# Patient Record
Sex: FEMALE | Race: WHITE | NOT HISPANIC OR LATINO | Employment: UNEMPLOYED | URBAN - METROPOLITAN AREA
[De-identification: names, ages, dates, MRNs, and addresses within clinical notes are randomized per-mention and may not be internally consistent; named-entity substitution may affect disease eponyms.]

---

## 2023-09-27 ENCOUNTER — NON-PROVIDER VISIT (OUTPATIENT)
Dept: URGENT CARE | Facility: CLINIC | Age: 28
End: 2023-09-27
Payer: COMMERCIAL

## 2023-09-27 DIAGNOSIS — Z02.1 PRE-EMPLOYMENT DRUG SCREENING: ICD-10-CM

## 2023-09-27 LAB
AMP AMPHETAMINE: NORMAL
COC COCAINE: NORMAL
INT CON NEG: NORMAL
INT CON POS: NORMAL
MET METHAMPHETAMINES: NORMAL
OPI OPIATES: NORMAL
PCP PHENCYCLIDINE: NORMAL
POC DRUG COMMENT 753798-OCCUPATIONAL HEALTH: NEGATIVE
THC: NORMAL

## 2023-09-27 PROCEDURE — 80305 DRUG TEST PRSMV DIR OPT OBS: CPT | Performed by: PHYSICIAN ASSISTANT

## 2023-12-24 ENCOUNTER — OFFICE VISIT (OUTPATIENT)
Dept: URGENT CARE | Facility: CLINIC | Age: 28
End: 2023-12-24
Payer: COMMERCIAL

## 2023-12-24 VITALS
HEIGHT: 64 IN | OXYGEN SATURATION: 99 % | DIASTOLIC BLOOD PRESSURE: 48 MMHG | BODY MASS INDEX: 17.58 KG/M2 | RESPIRATION RATE: 16 BRPM | WEIGHT: 103 LBS | SYSTOLIC BLOOD PRESSURE: 90 MMHG | HEART RATE: 102 BPM | TEMPERATURE: 96.9 F

## 2023-12-24 DIAGNOSIS — R07.81 RIB PAIN ON RIGHT SIDE: ICD-10-CM

## 2023-12-24 PROCEDURE — 99213 OFFICE O/P EST LOW 20 MIN: CPT

## 2023-12-24 PROCEDURE — 3074F SYST BP LT 130 MM HG: CPT

## 2023-12-24 PROCEDURE — 3078F DIAST BP <80 MM HG: CPT

## 2023-12-24 NOTE — PROGRESS NOTES
"Subjective     Aster Copeland is a 28 y.o. female who presents with Rib Injury (Can't take a deep breath. X1 day. Right side. )            HPI patient works for [employer], she states they have been working a lot  This morning she woke up with right-sided rib pain  She does usually sleep on her right side  She states that hurts when she takes a deep breath or when she is twisting and moving  She denies any injury  She denies any cough or fevers      ROS Same as above      No Known Allergies    Current Outpatient Medications   Medication Sig    diclofenac sodium (VOLTAREN) 1 % Gel Apply 1 g topically 3 times a day as needed (pain).    paroxetine (PAXIL) 10 MG Tab Take 10 mg by mouth every day. (Patient not taking: Reported on 12/24/2023)    alprazolam (XANAX) 1 MG Tab Take 1 mg by mouth 3 times a day as needed for Anxiety. (Patient not taking: Reported on 12/24/2023)    propranolol CR (INDERAL LA) 80 MG CAPSULE SR 24 HR Take 80 mg by mouth every day. (Patient not taking: Reported on 12/24/2023)    zolpidem (AMBIEN) 10 MG Tab Take 10 mg by mouth at bedtime as needed for Sleep. (Patient not taking: Reported on 12/24/2023)        Past Medical History:   Diagnosis Date    Anxiety     Bipolar affective disorder (HCC)     Depression     Panic attack         Social History     Socioeconomic History    Marital status: Single   Tobacco Use    Smoking status: Never   Substance and Sexual Activity    Alcohol use: No    Drug use: Yes     Types: Inhaled     Comment: THC          Objective     BP 90/48 (BP Location: Left arm, Patient Position: Sitting, BP Cuff Size: Adult)   Pulse (!) 102   Temp 36.1 °C (96.9 °F) (Temporal)   Resp 16   Ht 1.626 m (5' 4\")   Wt 46.7 kg (103 lb)   SpO2 99%   BMI 17.68 kg/m²      Physical Exam  Vitals reviewed.   Constitutional:       Appearance: Normal appearance. She is not ill-appearing.   HENT:      Head: Normocephalic and atraumatic.      Nose: Nose normal. No congestion.   Eyes:      " Conjunctiva/sclera: Conjunctivae normal.      Pupils: Pupils are equal, round, and reactive to light.   Cardiovascular:      Rate and Rhythm: Normal rate and regular rhythm.      Heart sounds: Normal heart sounds.   Pulmonary:      Effort: Pulmonary effort is normal.      Breath sounds: Normal breath sounds. No stridor. No wheezing, rhonchi or rales.   Chest:      Chest wall: No swelling, tenderness or crepitus.      Comments: No pain with palpation of right ribs, no edema, no ecchymosis  Musculoskeletal:         General: Normal range of motion.      Cervical back: Normal range of motion and neck supple.   Skin:     General: Skin is warm and dry.   Neurological:      Mental Status: She is alert and oriented to person, place, and time.           Assessment & Plan     Patient comes in today due to right-sided rib pain.  The pain is when she takes a deep breath, or moves and twists.  She states she just woke up with it, she does sleep on her right side.  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    On exam there is no ecchymosis or edema to the right lateral rib cage.  There is no bony pain elicited with palpation.  Mild pain elicited with twisting motion, there is no decreased ROM.  No pain with palpation of thoracic spine.  Lung sounds are clear, no wheezing, no rhonchi, breath sounds are normal.  Heart sounds are normal, regular rhythm.  Discussed likely musculoskeletal etiology of current pain.  Discussed low likelihood of need for x-ray due to no actual injury, patient agrees and defers rib x-ray at this time.  We did discuss use of warm and cold compresses, Voltaren cream for symptom management.   Differential diagnosis, natural history, supportive care, and indications for immediate follow-up were discussed. Patient verbalized understanding and agreement with plan of care.  Note for work provided        1. Rib pain on right side  diclofenac sodium (VOLTAREN) 1 %  Gel

## 2023-12-24 NOTE — LETTER
December 24, 2023         Patient: Aster Copeland   YOB: 1995   Date of Visit: 12/24/2023           To Whom it May Concern:    Aster Copeland was seen in my clinic on 12/24/2023. Please excuse any absences this week due to an acute illness.     If you have any questions or concerns, please don't hesitate to call.        Sincerely,           MARTITA [email or web address].  Electronically Signed

## 2024-03-28 ENCOUNTER — OFFICE VISIT (OUTPATIENT)
Dept: URGENT CARE | Facility: PHYSICIAN GROUP | Age: 29
End: 2024-03-28
Payer: COMMERCIAL

## 2024-03-28 VITALS
BODY MASS INDEX: 19.46 KG/M2 | TEMPERATURE: 98.3 F | RESPIRATION RATE: 18 BRPM | WEIGHT: 114 LBS | OXYGEN SATURATION: 99 % | HEART RATE: 72 BPM | SYSTOLIC BLOOD PRESSURE: 108 MMHG | DIASTOLIC BLOOD PRESSURE: 60 MMHG | HEIGHT: 64 IN

## 2024-03-28 DIAGNOSIS — J06.9 VIRAL UPPER RESPIRATORY ILLNESS: ICD-10-CM

## 2024-03-28 PROCEDURE — 3074F SYST BP LT 130 MM HG: CPT | Performed by: PHYSICIAN ASSISTANT

## 2024-03-28 PROCEDURE — 99213 OFFICE O/P EST LOW 20 MIN: CPT | Performed by: PHYSICIAN ASSISTANT

## 2024-03-28 PROCEDURE — 3078F DIAST BP <80 MM HG: CPT | Performed by: PHYSICIAN ASSISTANT

## 2024-03-28 RX ORDER — DULOXETIN HYDROCHLORIDE 20 MG/1
20 CAPSULE, DELAYED RELEASE ORAL
COMMUNITY
Start: 2024-02-20

## 2024-03-28 RX ORDER — NAPROXEN 500 MG/1
TABLET ORAL
COMMUNITY
Start: 2024-02-20

## 2024-03-28 RX ORDER — POLYETHYLENE GLYCOL 3350 17 G
POWDER IN PACKET (EA) ORAL
COMMUNITY
Start: 2024-02-20

## 2024-03-28 ASSESSMENT — ENCOUNTER SYMPTOMS
SHORTNESS OF BREATH: 0
EYE DISCHARGE: 0
SORE THROAT: 1
VOMITING: 0
CONSTIPATION: 0
ABDOMINAL PAIN: 0
WHEEZING: 0
EYE REDNESS: 0
CHILLS: 0
DIARRHEA: 0
FEVER: 0
SINUS PAIN: 0
NAUSEA: 0
DIAPHORESIS: 0
HEADACHES: 0
EYE PAIN: 0
DIZZINESS: 0
COUGH: 1

## 2024-03-28 NOTE — PROGRESS NOTES
"  Subjective:     Aster Copeland  is a 28 y.o. female who presents for Letter for School/Work (Is here just for a note 3/27/24 and 3/28/24 )       Presents today with cough, sinus congestion and sore throat has been ongoing over the last 2 days.  Due to the symptoms she did need to miss work on 3/27 and 3/28 and is requesting a work note at this time.  Denies fever/chills/sweats, chest pain, shortness of breath, nausea/vomiting, abdominal pain, diarrhea.  Been using over-the-counter medications for symptoms       Review of Systems   Constitutional:  Negative for chills, diaphoresis, fever and malaise/fatigue.   HENT:  Positive for congestion and sore throat. Negative for ear discharge and sinus pain.    Eyes:  Negative for pain, discharge and redness.   Respiratory:  Positive for cough. Negative for shortness of breath and wheezing.    Cardiovascular:  Negative for chest pain.   Gastrointestinal:  Negative for abdominal pain, constipation, diarrhea, nausea and vomiting.   Neurological:  Negative for dizziness and headaches.      No Known Allergies  Past Medical History:   Diagnosis Date    Anxiety     Bipolar affective disorder (HCC)     Depression     Panic attack         Objective:   /60 (BP Location: Left arm, Patient Position: Sitting, BP Cuff Size: Adult)   Pulse 72   Temp 36.8 °C (98.3 °F) (Temporal)   Resp 18   Ht 1.626 m (5' 4\")   Wt 51.7 kg (114 lb)   SpO2 99%   BMI 19.57 kg/m²   Physical Exam  Vitals and nursing note reviewed.   Constitutional:       General: She is not in acute distress.     Appearance: Normal appearance. She is not ill-appearing, toxic-appearing or diaphoretic.   HENT:      Head: Normocephalic.      Right Ear: Tympanic membrane, ear canal and external ear normal. There is no impacted cerumen.      Left Ear: Tympanic membrane, ear canal and external ear normal. There is no impacted cerumen.      Nose: Congestion present. No rhinorrhea.      Mouth/Throat:      Mouth: Mucous " membranes are moist.      Pharynx: No oropharyngeal exudate or posterior oropharyngeal erythema.   Eyes:      General:         Right eye: No discharge.         Left eye: No discharge.      Conjunctiva/sclera: Conjunctivae normal.   Cardiovascular:      Rate and Rhythm: Normal rate and regular rhythm.   Pulmonary:      Effort: Pulmonary effort is normal. No respiratory distress.      Breath sounds: Normal breath sounds. No stridor. No wheezing or rhonchi.   Musculoskeletal:      Cervical back: Neck supple.   Lymphadenopathy:      Cervical: No cervical adenopathy.   Neurological:      General: No focal deficit present.      Mental Status: She is alert and oriented to person, place, and time.   Psychiatric:         Mood and Affect: Mood normal.         Behavior: Behavior normal.         Thought Content: Thought content normal.         Judgment: Judgment normal.             Diagnostic testing: None    Assessment/Plan:     Encounter Diagnoses   Name Primary?    Viral upper respiratory illness           Plan for care for today's complaint includes providing a work note for the patient.  Continue to use over-the-counter medications for symptom support.  Symptoms do not appear to be viral in nature, no evidence of support and medication at this time.  Vital signs are stable patient is well-appearing overall.  Continue to monitor symptoms and return to urgent care or follow-up with primary care provider if symptoms remain ongoing.  Follow-up in the emergency department if symptoms become severe, ER precautions discussed in office today..    See AVS Instructions below for written guidance provided to patient on after-visit management and care in addition to our verbal discussion during the visit.    Please note that this dictation was created using voice recognition software. I have attempted to correct all errors, but there may be sound-alike, spelling, grammar and possibly content errors that I did not discover before  finalizing the note.    Abdelrahman Pelayo PA-C

## 2024-03-28 NOTE — LETTER
[facility] [place]  [address]  [place] [id number]     March 28, 2024    Patient: Aster Copeland   YOB: 1995   Date of Visit: 3/28/2024       To Whom It May Concern:    Aster Copeland was seen and treated in our department on 3/28/2024.  Please excuse from work on 3/27/2024, 3/28/2024.  Can return to work at this time as she is no longer contagious    Sincerely,     Abdelrahman CAT Pelayo.